# Patient Record
Sex: FEMALE | Race: WHITE | Employment: PART TIME | URBAN - METROPOLITAN AREA
[De-identification: names, ages, dates, MRNs, and addresses within clinical notes are randomized per-mention and may not be internally consistent; named-entity substitution may affect disease eponyms.]

---

## 2017-01-03 ENCOUNTER — OFFICE VISIT (OUTPATIENT)
Dept: SURGERY | Age: 67
End: 2017-01-03

## 2017-01-03 VITALS
RESPIRATION RATE: 20 BRPM | HEART RATE: 68 BPM | HEIGHT: 59 IN | DIASTOLIC BLOOD PRESSURE: 76 MMHG | BODY MASS INDEX: 38.51 KG/M2 | WEIGHT: 191 LBS | TEMPERATURE: 97.6 F | SYSTOLIC BLOOD PRESSURE: 123 MMHG

## 2017-01-03 DIAGNOSIS — E88.81 METABOLIC SYNDROME: ICD-10-CM

## 2017-01-03 DIAGNOSIS — E78.2 MIXED HYPERLIPIDEMIA: Primary | ICD-10-CM

## 2017-01-03 RX ORDER — METFORMIN HYDROCHLORIDE 500 MG/1
500 TABLET ORAL 2 TIMES DAILY WITH MEALS
Qty: 60 TAB | Refills: 3 | Status: SHIPPED | OUTPATIENT
Start: 2017-01-03

## 2017-01-03 RX ORDER — MULTIVITAMIN
1 TABLET ORAL DAILY
COMMUNITY

## 2017-01-03 NOTE — PROGRESS NOTES
George Barger is a 77 y.o. female who presents today with   Chief Complaint   Patient presents with    Morbid Obesity     Previous under [facility] Weight loss program with Dr. Beatriz Ferrer. Pt presents as a new patient consulting with [facility]. Body mass index is 38.58 kg/(m^2). 1. Have you been to the ER, urgent care clinic since your last visit? Hospitalized since your last visit? No    2. Have you seen or consulted any other health care providers outside of the [address] [place] [address] since your last visit? Include any pap smears or colon screening.  No

## 2017-01-03 NOTE — MR AVS SNAPSHOT
Visit Information Date & Time Provider Department Dept. Phone Encounter #  
 1/3/2017  1:00 PM Pearl Oglesby PA-C [facility]  Upcoming Health Maintenance Date Due Hepatitis C Screening 1950 DTaP/Tdap/Td series (1 - Tdap) 12/6/1971 BREAST CANCER SCRN MAMMOGRAM 12/6/2000 FOBT Q 1 YEAR AGE 50-75 12/6/2000 ZOSTER VACCINE AGE 60> 12/6/2010 GLAUCOMA SCREENING Q2Y 12/6/2015 OSTEOPOROSIS SCREENING (DEXA) 12/6/2015 Pneumococcal 65+ Low/Medium Risk (1 of 2 - PCV13) 12/6/2015 MEDICARE YEARLY EXAM 12/6/2015 INFLUENZA AGE 9 TO ADULT 8/1/2016 Allergies as of 1/3/2017  Review Complete On: 1/3/2017 By: Socorro Bowman PA-C Severity Noted Reaction Type Reactions Codeine  10/07/2013    Other (comments) Current Immunizations  Reviewed on 11/15/2016 No immunizations on file. Not reviewed this visit You Were Diagnosed With   
  
 Codes Comments Mixed hyperlipidemia    -  Primary ICD-10-CM: K18.6 ICD-9-CM: 272.2 Metabolic syndrome     RHJ-38-QK: V26.92 ICD-9-CM: 277.7 Obesity, Class II, BMI 35.0-39.9, with comorbidity (see actual BMI) (HCC)     ICD-10-CM: E66.01 
ICD-9-CM: 278.01 Vitals BP Pulse Temp Resp Height(growth percentile) Weight(growth percentile) 123/76 (BP 1 Location: Left arm, BP Patient Position: At rest) 68 97.6 °F (36.4 °C) (Oral) 20 4' 11\" (1.499 m) 191 lb (86.6 kg) LMP BMI OB Status Smoking Status (Exact Date) 38.58 kg/m2 Postmenopausal Never Smoker BMI and BSA Data Body Mass Index Body Surface Area 38.58 kg/m 2 1.9 m 2 Preferred Pharmacy Pharmacy Name Phone [address] [facility] [phone] Your Updated Medication List  
  
   
This list is accurate as of: 1/3/17  2:32 PM.  Always use your most recent med list.  
  
  
  
  
 Lillie Starling  
by Does Not Apply route. Biotin 2,500 mcg Cap Take  by mouth.  
  
 calcium-cholecalciferol (D3) tablet Commonly known as:  CALTRATE 600+D Take 1 Tab by mouth daily. COCONUT OIL PO Take  by mouth. EXCEDRIN MIGRAINE PO Take  by mouth. FISH OIL 1,000 mg Cap Generic drug:  omega-3 fatty acids-vitamin e Take 1 capsule by mouth.  
  
 metFORMIN 500 mg tablet Commonly known as:  GLUCOPHAGE Take 1 Tab by mouth two (2) times daily (with meals). Indications: PREVENTION OF TYPE 2 DIABETES MELLITUS  
  
 OTHER [employer]nergy TURMERIC (BULK)  
by Does Not Apply route. TYLENOL PM EXTRA STRENGTH  mg Tab Generic drug:  diphenhydrAMINE-acetaminophen Take  by mouth. Prescriptions Printed Refills  
 metFORMIN (GLUCOPHAGE) 500 mg tablet 3 Sig: Take 1 Tab by mouth two (2) times daily (with meals). Indications: PREVENTION OF TYPE 2 DIABETES MELLITUS Class: Print Route: Oral  
  
Introducing [facility]! Dear Florance Files: Thank you for requesting a cloudControl account. Our records indicate that you have previously registered for a cloudControl account but its currently inactive. Please call our cloudControl support line at [phone]. Additional Information If you have questions, please visit the Frequently Asked Questions section of the cloudControl website at [email or web address] Remember, cloudControl is NOT to be used for urgent needs. For medical emergencies, dial 911. Now available from your iPhone and Android! Please provide this summary of care documentation to your next provider. Your primary care clinician is listed as CAYETANO VENCES. If you have any questions after today's visit, please call [phone].

## 2017-01-04 NOTE — PROGRESS NOTES
Initial Consultation for Management of Obesity  Leonid Carl is a 77 y.o. female who comes into the office today for initial consultation to discuss options available for the treatment of morbid obesity. She has chronic obesity unresponsive to numerous treatment strategies, including previous participation in [facility] through [facility]. She desires weight loss in order to assist in controlling her comorbidities, specifically hyperlipidemia, and metabolic syndrome . She is unsure of what she is willing to do in order to meet her goals of weight control, and is especially cautious about bariatric surgery. She has attended a bariatric surgery seminar at [facility] in the remote past, but was not interested in pursuing surgery then. She is open to discussing medications for weight loss as well as continuing to work with RD in our program.   Prior to starting in the [facility] she was at heaviest weight of 225. Weight pura was 177 with pt goal weight of 150lbs. Today she is Height: 4' 11\" (149.9 cm) , Weight: 86.6 kg (191 lb) for a BMI of Body mass index is 38.58 kg/(m^2). She has a documented elevated fasting insulin prior to starting in program, as well as skin tags and central obesity. She craves CHO, especially sweets frequently and has difficulty eliminating from diet. Weight Loss Metrics 1/3/2017 11/15/2016 10/17/2016 8/29/2016 8/22/2016 8/15/2016 8/8/2016   Today's Wt 191 lb 183 lb 182 lb 182 lb 9.6 oz 181 lb 182 lb 6.4 oz 180 lb 9.6 oz   BMI 38.58 kg/m2 36.96 kg/m2 36.76 kg/m2 36.88 kg/m2 36.56 kg/m2 36.84 kg/m2 36.46 kg/m2       Body mass index is 38.58 kg/(m^2).         Past Medical History   Diagnosis Date    Arthritis     Back pain, chronic     Bronchitis     Bunion, left foot     Migraine     Morbid obesity ([address]     Obesity     UTI (lower urinary tract infection) 12/11/2014    UTI (urinary tract infection)        Past Surgical History   Procedure Laterality Date    Hx bunionectomy  2013     left       Current Outpatient Prescriptions   Medication Sig Dispense Refill    calcium-cholecalciferol, D3, (CALTRATE 600+D) tablet Take 1 Tab by mouth daily.  metFORMIN (GLUCOPHAGE) 500 mg tablet Take 1 Tab by mouth two (2) times daily (with meals). Indications: PREVENTION OF TYPE 2 DIABETES MELLITUS 60 Tab 3    COCONUT OIL PO Take  by mouth.  Biotin 2,500 mcg cap Take  by mouth.  OTHER Synergy      diphenhydrAMINE-acetaminophen (TYLENOL PM EXTRA STRENGTH)  mg tab Take  by mouth.  ACETYLCYSTEINE by Does Not Apply route.  ASPIRIN/ACETAMINOPHEN/CAFFEINE (EXCEDRIN MIGRAINE PO) Take  by mouth.  TURMERIC, BULK, by Does Not Apply route.  omega-3 fatty acids-vitamin e (FISH OIL) 1,000 mg cap Take 1 capsule by mouth. Allergies   Allergen Reactions    Codeine Other (comments)       Social History   Substance Use Topics    Smoking status: Never Smoker    Smokeless tobacco: Never Used    Alcohol use No   , works at [employer] as [employer].    Family History   Problem Relation Age of Onset    Cancer Mother     Cancer Brother     No Known Problems Father        ROS:  Review of Systems   Constitutional: Positive for malaise/fatigue. All other systems reviewed and are negative. Physical Exam:  Visit Vitals    /76 (BP 1 Location: Left arm, BP Patient Position: At rest)    Pulse 68    Temp 97.6 °F (36.4 °C) (Oral)    Resp 20    Ht 4' 11\" (1.499 m)    Wt 86.6 kg (191 lb)    LMP  (Exact Date)    BMI 38.58 kg/m2     Physical Exam   Constitutional: She is oriented to person, place, and time and well-developed, well-nourished, and in no distress. HENT:   Head: Normocephalic and atraumatic. Mouth/Throat: Oropharynx is clear and moist.   Eyes: Conjunctivae and EOM are normal. Pupils are equal, round, and reactive to light. Neck: Normal range of motion. Neck supple.  No thyromegaly present. Cardiovascular: Normal rate, regular rhythm and normal heart sounds. Exam reveals no gallop and no friction rub. No murmur heard. Pulmonary/Chest: Effort normal and breath sounds normal. No respiratory distress. She has no wheezes. She has no rales. She exhibits no tenderness. Abdominal: Soft. Bowel sounds are normal. She exhibits no distension and no mass. There is no tenderness. There is no rebound and no guarding. Musculoskeletal: Normal range of motion. She exhibits no edema or tenderness. Lymphadenopathy:     She has no cervical adenopathy. Neurological: She is alert and oriented to person, place, and time. Gait normal.   Skin: Skin is warm and dry. No rash noted. No erythema. No pallor. Psychiatric: Mood, memory, affect and judgment normal.       Impression:    Lila Rosario is a 77 y.o. female who is suffering from morbid obesity with a BMI of Body mass index is 38.58 kg/(m^2). and comorbidities including hyperlipidemia and metabolic syndrome who could benefit from weight loss significantly. She is struggling after previous participation in [facility] weight loss program. We reviewed all available options for the treatment of obesity including nutritional interventions, pharmacotherapy, and bariatric surgery. She has expressed disinterest in surgery for now, but agrees to keep her options open to surgery should she continue to struggle with her obesity in the future. Lengthy discussion re: \"obesity\" as a medical condition, ie: chronic disease--pt expressed distaste for terminology and has actually \"fired\" PCPs in the past for \"blaming everything on obesity\". Explained to pt that her obesity is in fact a disease and not a character flaw--reviewed threats to her health based on current and previous BMI. Metabolic syndrome--reviewed implications of insulin resistance and contributor to obesity disease--advised start metformin 500mg bid (SE reviewed).  Pt desires start rx-given. Suggest pt start meats/veggies diet, elim CHO from meal plan for now. Pt does not believe stress/anxiety a factor in eating behaviors, therefore will hold off on behavioral therapy referral for time being.   Counseling>50% of 60 minute visit  F/u 1 month, sooner brittaney Black PA-C

## 2021-06-23 ENCOUNTER — NEW PATIENT COMPREHENSIVE (OUTPATIENT)
Dept: URBAN - METROPOLITAN AREA CLINIC 38 | Facility: CLINIC | Age: 71
End: 2021-06-23

## 2021-06-23 DIAGNOSIS — H52.4: ICD-10-CM

## 2021-06-23 DIAGNOSIS — H52.01: ICD-10-CM

## 2021-06-23 DIAGNOSIS — H25.813: ICD-10-CM

## 2021-06-23 DIAGNOSIS — H52.203: ICD-10-CM

## 2021-06-23 DIAGNOSIS — H35.371: ICD-10-CM

## 2021-06-23 PROCEDURE — 92134 CPTRZ OPH DX IMG PST SGM RTA: CPT

## 2021-06-23 PROCEDURE — 99204 OFFICE O/P NEW MOD 45 MIN: CPT

## 2021-06-23 PROCEDURE — 92015 DETERMINE REFRACTIVE STATE: CPT

## 2021-06-23 ASSESSMENT — VISUAL ACUITY
OD_CC: J10
OD_CC: 20/25
OD_SC: J6
OS_CC: 20/25
OS_SC: 20/60
OD_SC: 20/80
OS_CC: J10
OS_SC: J8

## 2021-06-23 ASSESSMENT — TONOMETRY
OD_IOP_MMHG: 13
OS_IOP_MMHG: 15

## 2021-07-13 ENCOUNTER — CATARACT CONSULT (OUTPATIENT)
Dept: URBAN - METROPOLITAN AREA CLINIC 43 | Facility: CLINIC | Age: 71
End: 2021-07-13

## 2021-07-13 DIAGNOSIS — H52.4: ICD-10-CM

## 2021-07-13 DIAGNOSIS — H52.01: ICD-10-CM

## 2021-07-13 DIAGNOSIS — H52.203: ICD-10-CM

## 2021-07-13 DIAGNOSIS — H25.811: ICD-10-CM

## 2021-07-13 DIAGNOSIS — H35.371: ICD-10-CM

## 2021-07-13 DIAGNOSIS — H25.812: ICD-10-CM

## 2021-07-13 PROCEDURE — 92136TC INTERFEROMETRY - TECHNICAL COMPONENT

## 2021-07-13 PROCEDURE — 92134 CPTRZ OPH DX IMG PST SGM RTA: CPT

## 2021-07-13 PROCEDURE — 99214 OFFICE O/P EST MOD 30 MIN: CPT

## 2021-07-13 RX ORDER — PREDNISOLONE ACETATE 10 MG/ML: 1 SUSPENSION/ DROPS OPHTHALMIC

## 2021-07-13 RX ORDER — KETOROLAC TROMETHAMINE 5 MG/ML: 1 SOLUTION OPHTHALMIC

## 2021-07-13 RX ORDER — MOXIFLOXACIN HYDROCHLORIDE 5 MG/ML: 1 SOLUTION/ DROPS OPHTHALMIC

## 2021-07-13 ASSESSMENT — VISUAL ACUITY
OS_SC: J4
OS_CC: J2
OS_SC: 20/100
OD_RAM: 20/20
OS_CC: 20/40
OD_CC: J4
OS_BAT: 20/400
OS_AM: 20/20
OD_SC: 20/80
OD_CC: 20/50
OD_SC: J6
OD_BAT: 20/400

## 2021-07-13 ASSESSMENT — TONOMETRY
OS_IOP_MMHG: 15
OD_IOP_MMHG: 15

## 2021-07-27 ENCOUNTER — PRE-OP/H&P (OUTPATIENT)
Dept: URBAN - METROPOLITAN AREA CLINIC 39 | Facility: CLINIC | Age: 71
End: 2021-07-27

## 2021-07-27 ENCOUNTER — SURGERY/PROCEDURE (OUTPATIENT)
Dept: URBAN - METROPOLITAN AREA CLINIC 43 | Facility: CLINIC | Age: 71
End: 2021-07-27

## 2021-07-27 DIAGNOSIS — H25.812: ICD-10-CM

## 2021-07-27 DIAGNOSIS — H52.4: ICD-10-CM

## 2021-07-27 DIAGNOSIS — H52.203: ICD-10-CM

## 2021-07-27 DIAGNOSIS — H52.01: ICD-10-CM

## 2021-07-27 PROCEDURE — 66984 XCAPSL CTRC RMVL W/O ECP: CPT

## 2021-07-27 PROCEDURE — 99211T TECH SERVICE

## 2021-07-28 ENCOUNTER — POST OP/EVAL OF SECOND EYE (OUTPATIENT)
Dept: URBAN - METROPOLITAN AREA CLINIC 38 | Facility: CLINIC | Age: 71
End: 2021-07-28

## 2021-07-28 DIAGNOSIS — Z96.1: ICD-10-CM

## 2021-07-28 DIAGNOSIS — H25.811: ICD-10-CM

## 2021-07-28 PROCEDURE — 99024 POSTOP FOLLOW-UP VISIT: CPT

## 2021-07-28 PROCEDURE — 92012 INTRM OPH EXAM EST PATIENT: CPT

## 2021-07-28 ASSESSMENT — VISUAL ACUITY
OD_SC: J6
OS_SC: 20/40
OS_SC: J12>
OD_SC: 20/80

## 2021-07-28 ASSESSMENT — TONOMETRY
OS_IOP_MMHG: 16
OD_IOP_MMHG: 14

## 2021-08-17 ENCOUNTER — SURGERY/PROCEDURE (OUTPATIENT)
Dept: URBAN - METROPOLITAN AREA CLINIC 43 | Facility: CLINIC | Age: 71
End: 2021-08-17

## 2021-08-17 ENCOUNTER — PRE-OP/H&P (OUTPATIENT)
Dept: URBAN - METROPOLITAN AREA CLINIC 39 | Facility: CLINIC | Age: 71
End: 2021-08-17

## 2021-08-17 DIAGNOSIS — H52.4: ICD-10-CM

## 2021-08-17 DIAGNOSIS — H52.01: ICD-10-CM

## 2021-08-17 DIAGNOSIS — Z96.1: ICD-10-CM

## 2021-08-17 DIAGNOSIS — H52.203: ICD-10-CM

## 2021-08-17 DIAGNOSIS — H25.811: ICD-10-CM

## 2021-08-17 PROCEDURE — 66984 XCAPSL CTRC RMVL W/O ECP: CPT

## 2021-08-17 PROCEDURE — 99211T TECH SERVICE

## 2021-08-17 ASSESSMENT — VISUAL ACUITY
OD_SC: 20/100
OD_SC: J4
OS_SC: 20/25-2
OS_SC: <J12

## 2021-08-17 ASSESSMENT — TONOMETRY
OD_IOP_MMHG: 15
OS_IOP_MMHG: 15

## 2021-08-18 ENCOUNTER — CATARACT POST-OP 1-DAY (OUTPATIENT)
Dept: URBAN - METROPOLITAN AREA CLINIC 38 | Facility: CLINIC | Age: 71
End: 2021-08-18

## 2021-08-18 DIAGNOSIS — Z96.1: ICD-10-CM

## 2021-08-18 PROCEDURE — 99024 POSTOP FOLLOW-UP VISIT: CPT

## 2021-08-18 ASSESSMENT — VISUAL ACUITY
OS_SC: 20/20-2
OU_SC: J10
OU_SC: 20/25-2
OD_SC: 20/40
OD_PH: 20/25-2

## 2021-08-18 ASSESSMENT — KERATOMETRY
OS_K2POWER_DIOPTERS: 45.00
OD_K2POWER_DIOPTERS: 44.50
OS_AXISANGLE_DEGREES: 175
OD_AXISANGLE_DEGREES: 5
OS_K1POWER_DIOPTERS: 45.75
OD_AXISANGLE2_DEGREES: 95
OS_AXISANGLE2_DEGREES: 85
OD_K1POWER_DIOPTERS: 45.75

## 2021-08-18 ASSESSMENT — TONOMETRY
OD_IOP_MMHG: 16
OS_IOP_MMHG: 16

## 2021-09-01 ENCOUNTER — POST-OP CATARACT (OUTPATIENT)
Dept: URBAN - METROPOLITAN AREA CLINIC 38 | Facility: CLINIC | Age: 71
End: 2021-09-01

## 2021-09-01 DIAGNOSIS — Z96.1: ICD-10-CM

## 2021-09-01 PROCEDURE — 99024 POSTOP FOLLOW-UP VISIT: CPT

## 2021-09-01 ASSESSMENT — KERATOMETRY
OD_K1POWER_DIOPTERS: 45.75
OD_AXISANGLE2_DEGREES: 95
OD_AXISANGLE_DEGREES: 5
OS_AXISANGLE2_DEGREES: 90
OS_K1POWER_DIOPTERS: 45.75
OD_K2POWER_DIOPTERS: 44.50
OS_K2POWER_DIOPTERS: 44.75
OS_AXISANGLE_DEGREES: 180

## 2021-09-01 ASSESSMENT — VISUAL ACUITY
OD_SC: J12-
OU_SC: J6-
OD_SC: 20/40-2
OS_SC: 20/30-2
OU_SC: 20/20-1
OS_SC: J12
OD_PH: 20/30-2

## 2021-09-01 ASSESSMENT — TONOMETRY
OD_IOP_MMHG: 16
OS_IOP_MMHG: 16

## 2022-03-03 ENCOUNTER — COMPREHENSIVE EXAM (OUTPATIENT)
Dept: URBAN - METROPOLITAN AREA CLINIC 38 | Facility: CLINIC | Age: 72
End: 2022-03-03

## 2022-03-03 DIAGNOSIS — H35.371: ICD-10-CM

## 2022-03-03 DIAGNOSIS — H04.123: ICD-10-CM

## 2022-03-03 DIAGNOSIS — H52.4: ICD-10-CM

## 2022-03-03 DIAGNOSIS — H25.811: ICD-10-CM

## 2022-03-03 DIAGNOSIS — H52.03: ICD-10-CM

## 2022-03-03 DIAGNOSIS — Z96.1: ICD-10-CM

## 2022-03-03 DIAGNOSIS — H52.203: ICD-10-CM

## 2022-03-03 PROCEDURE — 92014 COMPRE OPH EXAM EST PT 1/>: CPT

## 2022-03-03 PROCEDURE — 92015 DETERMINE REFRACTIVE STATE: CPT

## 2022-03-03 ASSESSMENT — KERATOMETRY
OS_AXISANGLE_DEGREES: 180
OS_AXISANGLE2_DEGREES: 90
OS_K2POWER_DIOPTERS: 44.75
OD_K1POWER_DIOPTERS: 45.75
OD_AXISANGLE2_DEGREES: 95
OS_K1POWER_DIOPTERS: 45.75
OD_AXISANGLE_DEGREES: 5
OD_K2POWER_DIOPTERS: 44.50

## 2022-03-03 ASSESSMENT — VISUAL ACUITY
OU_CC: 20/25
OS_CC: 20/25
OD_CC: 20/30-2
OS_CC: J2
OD_SC: 20/40-2
OU_SC: 20/30-2
OD_CC: J3
OS_SC: 20/30-2

## 2022-03-03 ASSESSMENT — TONOMETRY
OS_IOP_MMHG: 16
OD_IOP_MMHG: 16

## 2024-04-04 ENCOUNTER — COMPREHENSIVE EXAM (OUTPATIENT)
Dept: URBAN - METROPOLITAN AREA CLINIC 38 | Facility: CLINIC | Age: 74
End: 2024-04-04

## 2024-04-04 DIAGNOSIS — H52.203: ICD-10-CM

## 2024-04-04 DIAGNOSIS — H52.03: ICD-10-CM

## 2024-04-04 DIAGNOSIS — H52.4: ICD-10-CM

## 2024-04-04 DIAGNOSIS — H02.834: ICD-10-CM

## 2024-04-04 DIAGNOSIS — H04.123: ICD-10-CM

## 2024-04-04 DIAGNOSIS — Z96.1: ICD-10-CM

## 2024-04-04 DIAGNOSIS — H02.831: ICD-10-CM

## 2024-04-04 DIAGNOSIS — H35.371: ICD-10-CM

## 2024-04-04 PROCEDURE — 92015 DETERMINE REFRACTIVE STATE: CPT

## 2024-04-04 PROCEDURE — 92014 COMPRE OPH EXAM EST PT 1/>: CPT

## 2024-04-04 PROCEDURE — 92134 CPTRZ OPH DX IMG PST SGM RTA: CPT

## 2024-04-04 ASSESSMENT — VISUAL ACUITY
OU_SC: 20/30
OS_CC: J1
OD_CC: J3
OD_SC: 20/30
OU_CC: J1
OS_SC: 20/30-1

## 2024-04-04 ASSESSMENT — KERATOMETRY
OD_K2POWER_DIOPTERS: 44.50
OS_K1POWER_DIOPTERS: 45.75
OD_K1POWER_DIOPTERS: 45.75
OS_AXISANGLE2_DEGREES: 90
OD_AXISANGLE2_DEGREES: 95
OS_AXISANGLE_DEGREES: 180
OD_AXISANGLE_DEGREES: 5
OS_K2POWER_DIOPTERS: 44.75

## 2024-04-04 ASSESSMENT — TONOMETRY
OS_IOP_MMHG: 15
OD_IOP_MMHG: 17

## 2025-04-10 ENCOUNTER — COMPREHENSIVE EXAM (OUTPATIENT)
Age: 75
End: 2025-04-10

## 2025-04-10 DIAGNOSIS — H43.811: ICD-10-CM

## 2025-04-10 DIAGNOSIS — H04.123: ICD-10-CM

## 2025-04-10 DIAGNOSIS — H02.831: ICD-10-CM

## 2025-04-10 DIAGNOSIS — H52.4: ICD-10-CM

## 2025-04-10 DIAGNOSIS — Z96.1: ICD-10-CM

## 2025-04-10 DIAGNOSIS — H52.03: ICD-10-CM

## 2025-04-10 DIAGNOSIS — H52.203: ICD-10-CM

## 2025-04-10 DIAGNOSIS — H02.834: ICD-10-CM

## 2025-04-10 DIAGNOSIS — H35.371: ICD-10-CM

## 2025-04-10 PROCEDURE — 92014 COMPRE OPH EXAM EST PT 1/>: CPT

## 2025-04-10 PROCEDURE — 92134 CPTRZ OPH DX IMG PST SGM RTA: CPT

## 2025-04-10 PROCEDURE — 92015 DETERMINE REFRACTIVE STATE: CPT
